# Patient Record
Sex: FEMALE | Race: WHITE
[De-identification: names, ages, dates, MRNs, and addresses within clinical notes are randomized per-mention and may not be internally consistent; named-entity substitution may affect disease eponyms.]

---

## 2019-02-04 ENCOUNTER — HOSPITAL ENCOUNTER (EMERGENCY)
Dept: HOSPITAL 26 - MED | Age: 84
End: 2019-02-04
Payer: COMMERCIAL

## 2019-02-04 VITALS — WEIGHT: 220 LBS | BODY MASS INDEX: 38.98 KG/M2 | HEIGHT: 63 IN

## 2019-02-04 DIAGNOSIS — J45.909: ICD-10-CM

## 2019-02-04 DIAGNOSIS — I46.9: Primary | ICD-10-CM

## 2019-02-04 DIAGNOSIS — N18.9: ICD-10-CM

## 2019-02-04 DIAGNOSIS — E11.22: ICD-10-CM

## 2019-02-04 DIAGNOSIS — I12.9: ICD-10-CM

## 2019-02-04 DIAGNOSIS — Z88.5: ICD-10-CM

## 2019-02-04 PROCEDURE — 92950 HEART/LUNG RESUSCITATION CPR: CPT

## 2019-02-04 PROCEDURE — 99291 CRITICAL CARE FIRST HOUR: CPT

## 2019-02-04 NOTE — NUR
JOANA FROM [facility] CALLED AND SAID THEY RECEIVED RELEASED, THEY WILL  
IN 2 HOURS.

## 2019-02-04 NOTE — NUR
-------------------------------------------------------------------------------

            *** Note undone in ALLEN - 02/04/19 at 1019 by ROSALIE ***             

-------------------------------------------------------------------------------

SEE CODE SHEET

## 2019-02-04 NOTE — NUR
-------------------------------------------------------------------------------

            *** Note billie in EDM - 02/04/19 at 1042 by ROSALIE ***             

-------------------------------------------------------------------------------

FULL ARREST SEE CODE SHEET. INTUBATED, 3 IO'S IN PLACE UPON ARRIVAL. 6 ROUND OF 
EPI GIVEN EN TRANSIT. ASYSTOLE UPON ARRIVAL. BIBA FROM HOME

## 2019-02-04 NOTE — NUR
DR. GRIMES OFFICE CONTACTED SPOKE WITH BETHEL   THEY SAID " WE WILL HAVE TO CALL 
THE DOCTOR TO SEE IF HE WILL SIGN AND RELEASE HER". 



PROVIDED WITH PHONE NUMBER, BETHEL TO CALL BACK

## 2019-02-04 NOTE — NUR
CALLED SPOKE TO ANA WHO SAID " THE COUNSELOR WILL FAX OVER A RELEASE FORM FOR 
THE FAMILY TO SIGN AND ONCE THE FAMILY SAYS THEY ARE READY WE HAVE A 2 HOUR 
WINDOW TO  ".

## 2019-02-04 NOTE — NUR
DR. GRIMES STATED " I DO NOT FEEL COMFORTABLY RELEASING HER BECAUSE SHE WAS SEEN 
IN THE [facility] TWO DAYS AGO AND  SENT HOME , I DO NOT KNOW IF THEY 
DID THE PROPER WORK UP ON HER, SO I DO NOT FEEL COMFORTABLE RELEASING HER". 



ER MD CHAVZE MADE AWARE.

## 2019-02-04 NOTE — NUR
FULL ARREST SEE CODE SHEET. INTUBATED, 3 IO'S IN PLACE 2 IN R LOWER LEG AND 1 
IN L LOWER LEG UPON ARRIVAL. 6 ROUND OF EPI GIVEN EN TRANSIT. ASYSTOLE UPON 
ARRIVAL. BIBA FROM HOME COMPRESSIONS INITIATED BY ALS TEAM, PER FIRE " SHE HAS 
BEEN DOWN FOR 45 MIN" , COMPRESSIONS CONTINUED UPON ARRIVAL.